# Patient Record
Sex: FEMALE | Employment: FULL TIME
[De-identification: names, ages, dates, MRNs, and addresses within clinical notes are randomized per-mention and may not be internally consistent; named-entity substitution may affect disease eponyms.]

---

## 2020-03-11 ENCOUNTER — HEALTH MAINTENANCE LETTER (OUTPATIENT)
Age: 35
End: 2020-03-11

## 2020-05-19 ENCOUNTER — NURSE TRIAGE (OUTPATIENT)
Dept: NURSING | Facility: CLINIC | Age: 35
End: 2020-05-19

## 2020-05-19 NOTE — TELEPHONE ENCOUNTER
States that she already spoke with [facility] and needs PCR testing.    Transferred to scheduling    Nuvia Lwo RN      Reason for Disposition    Requesting regular office appointment    Protocols used: INFORMATION ONLY CALL-A-AH

## 2021-01-03 ENCOUNTER — HEALTH MAINTENANCE LETTER (OUTPATIENT)
Age: 36
End: 2021-01-03

## 2021-04-25 ENCOUNTER — HEALTH MAINTENANCE LETTER (OUTPATIENT)
Age: 36
End: 2021-04-25

## 2021-10-10 ENCOUNTER — HEALTH MAINTENANCE LETTER (OUTPATIENT)
Age: 36
End: 2021-10-10

## 2021-12-29 NOTE — TELEPHONE ENCOUNTER
Diagnosis, Referred by & from: Rectal Bleeding   Appt date: 3/11/2022   NOTES STATUS DETAILS   OFFICE NOTE from referring provider  N/A    OFFICE NOTE from other specialist   N/A    DISCHARGE SUMMARY from hospital  N/A    DISCHARGE REPORT from the ER N/A    OPERATIVE REPORT  N/A    MEDICATION LIST N/A    LABS     ANAL PAP N/A    BIOPSIES/PATHOLOGY RELATED TO DIAGNOSIS N/A    DIAGNOSTIC PROCEDURES     PFC TESTING (from the Pelvic floor center includes Manometry, PDNL, EMG, etc.)    Note: May include imaging in the testing N/A    COLONOSCOPY N/A    UPPER ENDOSCOPY (EGD) N/A    FLEX SIGMOIDOSCOPY  N/A    ERCP N/A    IMAGING (DISC & REPORT)      CT  N/A    MRI N/A    XRAY N/A    ULTRASOUND (ENDOANAL/ENDORECTAL) N/A

## 2022-01-04 ENCOUNTER — OFFICE VISIT (OUTPATIENT)
Dept: SURGERY | Facility: CLINIC | Age: 37
End: 2022-01-04
Payer: COMMERCIAL

## 2022-01-04 DIAGNOSIS — R19.7 DIARRHEA, UNSPECIFIED TYPE: Primary | ICD-10-CM

## 2022-01-04 DIAGNOSIS — K62.5 RECTAL BLEEDING: ICD-10-CM

## 2022-01-04 DIAGNOSIS — L29.0 PRURITUS ANI: ICD-10-CM

## 2022-01-04 PROCEDURE — 99202 OFFICE O/P NEW SF 15 MIN: CPT | Performed by: NURSE PRACTITIONER

## 2022-01-04 NOTE — PROGRESS NOTES
Colon and Rectal Surgery Consult Clinic Note    Date: 2022     Referring provider:  Referred Self, MD  No address on file     RE: Prachi Mosley  : 1985  PARMINDER: 2022    Prachi Mosley is a very pleasant 36 year old female who presents today for rectal bleeding.    HPI:  Prachi has had issues with perianal itching for over 20 years.  This would occur intermittently and she uses a hydrocortisone cream.  The cream helps but as soon as she stops it the itching returns.  This is gotten worse in the past year.  She has had loose stools for over 10 years and urgency associated with these.  She has now noticed intermittent bright red blood with bowel movements.  This has dripped into the toilet on a few occasions.  She last noticed bleeding a few weeks ago.  She has never had a colonoscopy.  No family history of any inflammatory bowel disease or colon cancer.  Maternal great aunt had breast cancer.    Physical Examination:  There were no vitals taken for this visit.  General: Alert, oriented, in no acute distress  HEENT: mucous membranes moist    Perianal external examination: Exam was chaperoned by Laurence Moreno RN  Perianal skin: Some lichenification and fissuring of her perianal skin.  Lesions: No evidence of an external lesion, nodularity, or induration in the perianal region.  Eversion of buttocks: There was not evidence of an anal fissure. Details: N/A.  Skin tags or external hemorrhoids: None.  Digital rectal examination: Was performed.   Sphincter tone: Good.  Palpable lesions: No.  Other: None.  Bimanual examination: was not performed    Anoscopy: Was performed.   Hemorrhoids: No significant internal hemorrhoids.  Lesions: No    Assessment/Plan: 36 year old female with diarrhea, rectal bleeding, and pruritus ani.  I think her itching is likely due to the loose stools and seepage on her skin.  Recommended bulking her stools up with a fiber supplement such as Metamucil, Citrucel, or Benefiber. Try  folding a gauze in the buttock crease and determine whether there is any fecal staining that could be contributing and to keep area dry, Calmoseptine cream or zinc cream, Avoid scratching to avoid further trauma, increase fiber and water in diet, diet modification-try avoiding spicy foods, caffeine, tomatoes, carbonated beverages, milk products, cheese, chocolate, nuts, etc., using wet wipes rather than toilet paper, try wearing only cotton underwear, and avoid perfume-containing soaps (can try Dove soap).  Would like her to get a colonoscopy given the rectal bleeding and diarrhea.  If diarrhea does not resolve with the above interventions, we will have her meet with gastroenterology. Patient's questions were answered to her stated satisfaction and she is in agreement with this plan.    Medical history:  No past medical history on file.    Surgical history:  No past surgical history on file.    Problem list:  There are no problems to display for this patient.      Medications:  No current outpatient medications on file.       Allergies:  Not on File    Family history:  No family history on file.    Social history:  Social History     Tobacco Use     Smoking status: Not on file     Smokeless tobacco: Not on file   Substance Use Topics     Alcohol use: Not on file    Marital status: .    There are no exam notes on file for this visit.     20 minutes spent on the date of the encounter doing chart review, history and exam, documentation and further activities as noted above.     WOODROW Hernandez, NP-C  Colon and Rectal Surgery   [facility]    This note was created using speech recognition software and may contain unintended word substitutions.

## 2022-01-04 NOTE — PATIENT INSTRUCTIONS
GUIDE FOR PATIENTS WITH PRURITUS ANI    Pruritus ani, literally, means  itchy anus . This condition can have numerous causes, which can be as simple as hemorrhoids or as complex as uncommon infections and skin diseases. The purpose of the history, physical exam, and lab studies we have done and ordered will be to narrow the diagnostic possibilities to assure that you are getting the right treatment. As a first step, there are a number of things that need to be started and continued for the next 3-4 weeks.     1. Diet. Multiple foods can cause or worsen inflammation and itchiness around the anus. These include: coffee, tea, soda, alcohol, citrus, tomatoes, berries, and spicy or heavily spiced foods. These should be reduced or eliminated, if possible, from your diet.     2. Stop ALL creams, ointments, and topical preparations that you apply to your anus. It is very important that you inform us of all medications your are currently taking, including antibiotics, pain killers, and over-the-counter or natural medications. Occasionally (specially when the skin is very irritated), we will recommend a barrier cream, such as Calmoseptine or Desitin. Apply this 3-4 times per day.    3. We recommend daily bathing, preferably in a shower. Do not use medicated or perfumed soaps. Dove  is ideal as it is not soap and does not irritate your skin and do not use between buttocks. Washing-off the anus completely at the end of your shower is helpful to avoid any soap or shampoo residue on your skin. Also, using a hair dryer (on the cool air setting) is recommended to completely dry the perianal skin. If you have persistent leakage or a wet anus, we recommend placing a cotton ball or dry gauze and changing it as needed during the day. Loose, cotton undergarments are also recommended. Avoid recurrent and over-vigorous wiping after bowel movements. Unscented and non-medicated  wet-wipes  or  baby wipes  are preferred.    4. Persistent  itchiness will be treated with an oral medication, such as Atarax. These medications may cause grogginess and upset stomach. Make sure you take it with a meal or with a cracker or piece of toast.    5. Start on a fiber supplement such as Citrucel 3 times a day with 8-10 glasses of water a day. This will allow for a soft and easy bowel movement and will help  soak up  the extra moisture in your colon and rectum.    6. Colonoscopy

## 2022-01-04 NOTE — LETTER
2022       RE: Prachi Mosley  [address]   [place] [address]     Dear Colleague,    Thank you for referring your patient, Prachi Mosley, to the [facility] [place] [facility]. Please see a copy of my visit note below.    Colon and Rectal Surgery Consult Clinic Note    Date: 2022     Referring provider:  Referred Self, MD  No address on file     RE: Prachi Mosley  : 1985  PARMINDER: 2022    Prachi Mosley is a very pleasant 36 year old female who presents today for rectal bleeding.    HPI:  Prachi has had issues with perianal itching for over 20 years.  This would occur intermittently and she uses a hydrocortisone cream.  The cream helps but as soon as she stops it the itching returns.  This is gotten worse in the past year.  She has had loose stools for over 10 years and urgency associated with these.  She has now noticed intermittent bright red blood with bowel movements.  This has dripped into the toilet on a few occasions.  She last noticed bleeding a few weeks ago.  She has never had a colonoscopy.  No family history of any inflammatory bowel disease or colon cancer.  Maternal great aunt had breast cancer.    Physical Examination:  There were no vitals taken for this visit.  General: Alert, oriented, in no acute distress  HEENT: mucous membranes moist    Perianal external examination: Exam was chaperoned by Laurence Moreno RN  Perianal skin: Some lichenification and fissuring of her perianal skin.  Lesions: No evidence of an external lesion, nodularity, or induration in the perianal region.  Eversion of buttocks: There was not evidence of an anal fissure. Details: N/A.  Skin tags or external hemorrhoids: None.  Digital rectal examination: Was performed.   Sphincter tone: Good.  Palpable lesions: No.  Other: None.  Bimanual examination: was not performed    Anoscopy: Was performed.   Hemorrhoids:  No significant internal hemorrhoids.  Lesions: No    Assessment/Plan: 36 year old female with diarrhea, rectal bleeding, and pruritus ani.  I think her itching is likely due to the loose stools and seepage on her skin.  Recommended bulking her stools up with a fiber supplement such as Metamucil, Citrucel, or Benefiber. Try folding a gauze in the buttock crease and determine whether there is any fecal staining that could be contributing and to keep area dry, Calmoseptine cream or zinc cream, Avoid scratching to avoid further trauma, increase fiber and water in diet, diet modification-try avoiding spicy foods, caffeine, tomatoes, carbonated beverages, milk products, cheese, chocolate, nuts, etc., using wet wipes rather than toilet paper, try wearing only cotton underwear, and avoid perfume-containing soaps (can try Dove soap).  Would like her to get a colonoscopy given the rectal bleeding and diarrhea.  If diarrhea does not resolve with the above interventions, we will have her meet with gastroenterology. Patient's questions were answered to her stated satisfaction and she is in agreement with this plan.    Medical history:  No past medical history on file.    Surgical history:  No past surgical history on file.    Problem list:  There are no problems to display for this patient.      Medications:  No current outpatient medications on file.       Allergies:  Not on File    Family history:  No family history on file.    Social history:  Social History     Tobacco Use     Smoking status: Not on file     Smokeless tobacco: Not on file   Substance Use Topics     Alcohol use: Not on file    Marital status: .    There are no exam notes on file for this visit.     20 minutes spent on the date of the encounter doing chart review, history and exam, documentation and further activities as noted above.     WOODROW Hernandez, NP-C  Colon and Rectal Surgery   [facility]    This note  was created using speech recognition software and may contain unintended word substitutions.        Again, thank you for allowing me to participate in the care of your patient.      Sincerely,    WOODROW Gillespie CNP

## 2022-01-04 NOTE — LETTER
Date:January 18, 2022      Patient was self referred, no letter generated. Do not send.        [facility] Information

## 2022-01-05 ENCOUNTER — TELEPHONE (OUTPATIENT)
Dept: GASTROENTEROLOGY | Facility: CLINIC | Age: 37
End: 2022-01-05
Payer: COMMERCIAL

## 2022-01-05 NOTE — TELEPHONE ENCOUNTER
Screening Questions  1. Are you active on mychart? YES    2. What insurance is in the chart? BCBS     2.  Ordering/Referring Provider: America Rowe APRN CNP in [facility] COLON & RECTAL SURGERY    3. BMI 25.0, If greater than 40 review exclusion criteria also will need EXTENDED PREP    4.  Respiratory Screening (If yes to any of the following Hospital setting only):     Do you use daily home oxygen? N  Do you have mod to severe Obstructive Sleep Apnea? N   Do you have Pulmonary Hypertension? N   Do you have UNCONTROLLED asthma? N    5. Have you had a heart or lung transplant (If yes, please review exclusion criteria) ? N    6. Are you currently on dialysis or have chronic kidney disease? N    7. Have you had a stroke or Transient ischemic attack (TIA) within 6 months? N    8. In the past 6 months, have you had any heart related issues including cardiomyopathy or heart attack? N                 If yes, did it require cardiac stenting or other implantable device?N      9. Do you have any implantable devices in your body (pacemaker, defib, LVAD)? N    10. Do you take nitroglycerin? If yes, how often? N    11. Are you currently taking any blood thinners?N    12. Are you a diabetic? N    13. (Females) Are you currently pregnant? N  If yes, how many weeks?      15. Are you taking any prescription pain medications on a routine schedule? N If yes, MAC sedation and patient will need EXTENDED PREP.    16. Do you have any chemical dependencies such as alcohol, street drugs, or methadone? NIf yes, MAC sedation.    17. Do you have any history of post-traumatic stress syndrome, severe anxiety or history of psychosis? N    18. Do you transfer independently? Y    19.  Do you have any issues with constipation? N   If yes, pt will need EXTENDED PREP     20. Preferred Pharmacy for Pre Prescription [facility]     Scheduling Details    Which Colonoscopy Prep was Sent?: MIRALAX   Type of Procedure Scheduled: COLONOSCOPY    Surgeon: DR. WISDOM  Date of Procedure: 01/24/2022  [place] [facility] [place]er (Please ask for phone number if not scheduled by patient): Prachi Mosley      Sedation Type: CS  Conscious Sedation- Needs  for 6 hours after the procedure  MAC/General-Needs  for 24 hours after procedure    Pre-op Required at [facility], [place], [place] and OR for MAC sedation: N  (if yes advise patient they will need a pre-op prior to procedure)      Is patient on blood thinners? -N (If yes- inform patient to follow up with PCP or provider for follow up instructions)     Informed patient they will need an adult  Y  Cannot take any type of public or medical transportation alone    Pre-Procedure Covid test to be completed at [facility] or Externally: SCHEDULED    Confirmed Nurse will call to complete assessment Y    Additional comments: N

## 2022-01-11 DIAGNOSIS — Z11.59 ENCOUNTER FOR SCREENING FOR OTHER VIRAL DISEASES: Primary | ICD-10-CM

## 2022-01-17 ENCOUNTER — TELEPHONE (OUTPATIENT)
Dept: GASTROENTEROLOGY | Facility: CLINIC | Age: 37
End: 2022-01-17

## 2022-01-17 DIAGNOSIS — R19.7 DIARRHEA: Primary | ICD-10-CM

## 2022-01-17 NOTE — TELEPHONE ENCOUNTER
Patient scheduled for colonoscopy on 1/24/22.     Covid test scheduled: 1/21/22    Arrival time: 1255    Facility location: [facility]    Sedation type: CS    Indication for procedure: diarrhea    Anticoagulations? no    Bowel prep recommendation: Miralax/Magnesium citrate/Dulcolax     Pre visit planning completed.    Nuvia Velasquez RN

## 2022-01-18 RX ORDER — POLYETHYLENE GLYCOL 3350 17 G/17G
POWDER, FOR SOLUTION ORAL
Qty: 238 G | Refills: 0 | Status: SHIPPED | OUTPATIENT
Start: 2022-01-18 | End: 2022-05-26

## 2022-01-18 RX ORDER — SIMETHICONE 125 MG
TABLET,CHEWABLE ORAL
Qty: 4 TABLET | Refills: 0 | Status: SHIPPED | OUTPATIENT
Start: 2022-01-18 | End: 2022-05-26

## 2022-01-18 RX ORDER — BISACODYL 5 MG/1
TABLET, DELAYED RELEASE ORAL
Qty: 4 TABLET | Refills: 0 | Status: SHIPPED | OUTPATIENT
Start: 2022-01-18 | End: 2022-05-26

## 2022-01-18 NOTE — TELEPHONE ENCOUNTER
Pre assessment questions completed for upcoming colonoscopy procedure scheduled on 1/24/22    COVID test scheduled 1/21/22    Reviewed procedural arrival time 1255 and facility location [facility].    Designated  policy reviewed. Instructed to have someone stay 6 hours post procedure.     Reviewed Miralax prep instructions with patient. No fiber/iron supplements or foods that contain nuts/seeds 7 days prior to procedure.     Patient requesting Miralax prep to be sent to pharmacy. Sent as requested to [place], [place] - [address] [place] [address] [phone]    Anticoagulation/blood thinners? no    Electronic implanted devices? no    Patient verbalized understanding and had no questions or concerns at this time.    Nuvia Velasquez RN

## 2022-01-21 ENCOUNTER — LAB (OUTPATIENT)
Dept: LAB | Facility: CLINIC | Age: 37
End: 2022-01-21
Payer: COMMERCIAL

## 2022-01-21 DIAGNOSIS — Z11.59 ENCOUNTER FOR SCREENING FOR OTHER VIRAL DISEASES: ICD-10-CM

## 2022-01-21 PROCEDURE — 99000 SPECIMEN HANDLING OFFICE-LAB: CPT | Performed by: PATHOLOGY

## 2022-01-21 PROCEDURE — U0005 INFEC AGEN DETEC AMPLI PROBE: HCPCS | Mod: 90 | Performed by: PATHOLOGY

## 2022-01-21 PROCEDURE — U0003 INFECTIOUS AGENT DETECTION BY NUCLEIC ACID (DNA OR RNA); SEVERE ACUTE RESPIRATORY SYNDROME CORONAVIRUS 2 (SARS-COV-2) (CORONAVIRUS DISEASE [COVID-19]), AMPLIFIED PROBE TECHNIQUE, MAKING USE OF HIGH THROUGHPUT TECHNOLOGIES AS DESCRIBED BY CMS-2020-01-R: HCPCS | Mod: 90 | Performed by: PATHOLOGY

## 2022-01-22 LAB — SARS-COV-2 RNA RESP QL NAA+PROBE: NEGATIVE

## 2022-01-24 ENCOUNTER — ANESTHESIA (OUTPATIENT)
Dept: SURGERY | Facility: AMBULATORY SURGERY CENTER | Age: 37
End: 2022-01-24
Payer: COMMERCIAL

## 2022-01-24 ENCOUNTER — HOSPITAL ENCOUNTER (OUTPATIENT)
Facility: AMBULATORY SURGERY CENTER | Age: 37
End: 2022-01-24
Attending: INTERNAL MEDICINE
Payer: COMMERCIAL

## 2022-01-24 ENCOUNTER — ANESTHESIA EVENT (OUTPATIENT)
Dept: SURGERY | Facility: AMBULATORY SURGERY CENTER | Age: 37
End: 2022-01-24
Payer: COMMERCIAL

## 2022-01-24 VITALS
TEMPERATURE: 97 F | SYSTOLIC BLOOD PRESSURE: 105 MMHG | BODY MASS INDEX: 22.31 KG/M2 | RESPIRATION RATE: 14 BRPM | WEIGHT: 121.25 LBS | HEIGHT: 62 IN | DIASTOLIC BLOOD PRESSURE: 77 MMHG | OXYGEN SATURATION: 100 % | HEART RATE: 69 BPM

## 2022-01-24 VITALS — HEART RATE: 74 BPM

## 2022-01-24 LAB
COLONOSCOPY: NORMAL
HCG UR QL: NEGATIVE
INTERNAL QC OK POCT: NORMAL
POCT KIT EXPIRATION DATE: NORMAL
POCT KIT LOT NUMBER: NORMAL

## 2022-01-24 PROCEDURE — 88305 TISSUE EXAM BY PATHOLOGIST: CPT | Mod: TC | Performed by: INTERNAL MEDICINE

## 2022-01-24 PROCEDURE — 88305 TISSUE EXAM BY PATHOLOGIST: CPT | Mod: 26 | Performed by: PATHOLOGY

## 2022-01-24 PROCEDURE — 45380 COLONOSCOPY AND BIOPSY: CPT | Mod: 59

## 2022-01-24 PROCEDURE — 88312 SPECIAL STAINS GROUP 1: CPT | Mod: 26 | Performed by: PATHOLOGY

## 2022-01-24 PROCEDURE — 45385 COLONOSCOPY W/LESION REMOVAL: CPT

## 2022-01-24 RX ORDER — ONDANSETRON 2 MG/ML
4 INJECTION INTRAMUSCULAR; INTRAVENOUS EVERY 6 HOURS PRN
Status: DISCONTINUED | OUTPATIENT
Start: 2022-01-24 | End: 2022-01-25 | Stop reason: HOSPADM

## 2022-01-24 RX ORDER — LIDOCAINE 40 MG/G
CREAM TOPICAL
Status: DISCONTINUED | OUTPATIENT
Start: 2022-01-24 | End: 2022-01-24 | Stop reason: HOSPADM

## 2022-01-24 RX ORDER — PROCHLORPERAZINE MALEATE 10 MG
10 TABLET ORAL EVERY 6 HOURS PRN
Status: DISCONTINUED | OUTPATIENT
Start: 2022-01-24 | End: 2022-01-25 | Stop reason: HOSPADM

## 2022-01-24 RX ORDER — FLUMAZENIL 0.1 MG/ML
0.2 INJECTION, SOLUTION INTRAVENOUS
Status: DISCONTINUED | OUTPATIENT
Start: 2022-01-24 | End: 2022-01-25 | Stop reason: HOSPADM

## 2022-01-24 RX ORDER — ONDANSETRON 2 MG/ML
4 INJECTION INTRAMUSCULAR; INTRAVENOUS
Status: DISCONTINUED | OUTPATIENT
Start: 2022-01-24 | End: 2022-01-24 | Stop reason: HOSPADM

## 2022-01-24 RX ORDER — NALOXONE HYDROCHLORIDE 0.4 MG/ML
0.4 INJECTION, SOLUTION INTRAMUSCULAR; INTRAVENOUS; SUBCUTANEOUS
Status: DISCONTINUED | OUTPATIENT
Start: 2022-01-24 | End: 2022-01-25 | Stop reason: HOSPADM

## 2022-01-24 RX ORDER — ONDANSETRON 4 MG/1
4 TABLET, ORALLY DISINTEGRATING ORAL EVERY 6 HOURS PRN
Status: DISCONTINUED | OUTPATIENT
Start: 2022-01-24 | End: 2022-01-25 | Stop reason: HOSPADM

## 2022-01-24 RX ORDER — SODIUM CHLORIDE, SODIUM LACTATE, POTASSIUM CHLORIDE, CALCIUM CHLORIDE 600; 310; 30; 20 MG/100ML; MG/100ML; MG/100ML; MG/100ML
INJECTION, SOLUTION INTRAVENOUS CONTINUOUS
Status: DISCONTINUED | OUTPATIENT
Start: 2022-01-24 | End: 2022-01-24 | Stop reason: HOSPADM

## 2022-01-24 RX ORDER — NALOXONE HYDROCHLORIDE 0.4 MG/ML
0.2 INJECTION, SOLUTION INTRAMUSCULAR; INTRAVENOUS; SUBCUTANEOUS
Status: DISCONTINUED | OUTPATIENT
Start: 2022-01-24 | End: 2022-01-25 | Stop reason: HOSPADM

## 2022-01-24 RX ORDER — PROPOFOL 10 MG/ML
INJECTION, EMULSION INTRAVENOUS PRN
Status: DISCONTINUED | OUTPATIENT
Start: 2022-01-24 | End: 2022-01-24

## 2022-01-24 RX ORDER — PROPOFOL 10 MG/ML
INJECTION, EMULSION INTRAVENOUS CONTINUOUS PRN
Status: DISCONTINUED | OUTPATIENT
Start: 2022-01-24 | End: 2022-01-24

## 2022-01-24 RX ORDER — SIMETHICONE
LIQUID (ML) MISCELLANEOUS PRN
Status: DISCONTINUED | OUTPATIENT
Start: 2022-01-24 | End: 2022-01-24 | Stop reason: HOSPADM

## 2022-01-24 RX ORDER — LIDOCAINE HYDROCHLORIDE 20 MG/ML
INJECTION, SOLUTION INFILTRATION; PERINEURAL PRN
Status: DISCONTINUED | OUTPATIENT
Start: 2022-01-24 | End: 2022-01-24

## 2022-01-24 RX ADMIN — LIDOCAINE HYDROCHLORIDE 60 MG: 20 INJECTION, SOLUTION INFILTRATION; PERINEURAL at 14:14

## 2022-01-24 RX ADMIN — PROPOFOL 50 MG: 10 INJECTION, EMULSION INTRAVENOUS at 14:17

## 2022-01-24 RX ADMIN — PROPOFOL 250 MCG/KG/MIN: 10 INJECTION, EMULSION INTRAVENOUS at 14:14

## 2022-01-24 RX ADMIN — PROPOFOL 30 MG: 10 INJECTION, EMULSION INTRAVENOUS at 14:19

## 2022-01-24 RX ADMIN — SODIUM CHLORIDE, SODIUM LACTATE, POTASSIUM CHLORIDE, CALCIUM CHLORIDE: 600; 310; 30; 20 INJECTION, SOLUTION INTRAVENOUS at 14:08

## 2022-01-24 ASSESSMENT — MIFFLIN-ST. JEOR: SCORE: 1191.5

## 2022-01-24 NOTE — ANESTHESIA PREPROCEDURE EVALUATION
Anesthesia Pre-Procedure Evaluation    Patient: Prachi Mosley   MRN: [id number] : 1985        Preoperative Diagnosis: Diarrhea, unspecified type [R19.7]    Procedure : Procedure(s):  COLONOSCOPY          No past medical history on file.   No past surgical history on file.   Not on File   Social History     Tobacco Use     Smoking status: Not on file     Smokeless tobacco: Not on file   Substance Use Topics     Alcohol use: Not on file      Wt Readings from Last 1 Encounters:   No data found for Wt        Anesthesia Evaluation            ROS/MED HX  ENT/Pulmonary:  - neg pulmonary ROS     Neurologic:  - neg neurologic ROS     Cardiovascular:  - neg cardiovascular ROS     METS/Exercise Tolerance:     Hematologic:  - neg hematologic  ROS     Musculoskeletal:  - neg musculoskeletal ROS     GI/Hepatic: Comment: Diarrhea, rectal bleeding      Renal/Genitourinary:  - neg Renal ROS     Endo:  - neg endo ROS     Psychiatric/Substance Use:  - neg psychiatric ROS     Infectious Disease:  - neg infectious disease ROS     Malignancy:       Other:            Physical Exam    Airway        Mallampati: I   TM distance: > 3 FB   Neck ROM: full   Mouth opening: > 3 cm    Respiratory Devices and Support         Dental  no notable dental history         Cardiovascular   cardiovascular exam normal          Pulmonary   pulmonary exam normal                OUTSIDE LABS:  CBC: No results found for: WBC, HGB, HCT, PLT  BMP: No results found for: NA, POTASSIUM, CHLORIDE, CO2, BUN, CR, GLC  COAGS: No results found for: PTT, INR, FIBR  POC: No results found for: BGM, HCG, HCGS  HEPATIC: No results found for: ALBUMIN, PROTTOTAL, ALT, AST, GGT, ALKPHOS, BILITOTAL, BILIDIRECT, MILADYS  OTHER: No results found for: PH, LACT, A1C, SMITA, PHOS, MAG, LIPASE, AMYLASE, TSH, T4, T3, CRP, SED    Anesthesia Plan    ASA Status:  1   NPO Status:  NPO Appropriate    Anesthesia Type: MAC.     - Reason for MAC: straight local not clinically adequate,  immobility needed   Induction: Intravenous.   Maintenance: TIVA.        Consents    Anesthesia Plan(s) and associated risks, benefits, and realistic alternatives discussed. Questions answered and patient/representative(s) expressed understanding.     - Discussed: Risks, Benefits and Alternatives for BOTH SEDATION and the PROCEDURE were discussed     - Discussed with:  Patient      - Extended Intubation/Ventilatory Support Discussed: No.      - Patient is DNR/DNI Status: No    Use of blood products discussed: No .     Postoperative Care    Pain management: Multi-modal analgesia.   PONV prophylaxis: Background Propofol Infusion     Comments:                JOSIE ASIF MD

## 2022-01-24 NOTE — ANESTHESIA POSTPROCEDURE EVALUATION
Patient: Prachi Mosley    Procedure: Procedure(s):  COLONOSCOPY, WITH POLYPECTOMY AND BIOPSY       Diagnosis:Diarrhea, unspecified type [R19.7]  Diagnosis Additional Information: No value filed.    Anesthesia Type:  MAC    Note:  Disposition: Outpatient   Postop Pain Control: Uneventful            Sign Out: Well controlled pain   PONV: No   Neuro/Psych: Uneventful            Sign Out: Acceptable/Baseline neuro status   Airway/Respiratory: Uneventful            Sign Out: Acceptable/Baseline resp. status   CV/Hemodynamics: Uneventful            Sign Out: Acceptable CV status; No obvious hypovolemia; No obvious fluid overload   Other NRE: NONE   DID A NON-ROUTINE EVENT OCCUR? No           Last vitals:  Vitals Value Taken Time   /77 01/24/22 1515   Temp 36.1  C (97  F) 01/24/22 1515   Pulse 69 01/24/22 1515   Resp 14 01/24/22 1515   SpO2 100 % 01/24/22 1500       Electronically Signed By: JOSIE ASIF MD  January 24, 2022  3:28 PM

## 2022-01-24 NOTE — ANESTHESIA CARE TRANSFER NOTE
Patient: Prachi Mosley    Procedure: Procedure(s):  COLONOSCOPY, WITH POLYPECTOMY AND BIOPSY       Diagnosis: Diarrhea, unspecified type [R19.7]  Diagnosis Additional Information: No value filed.    Anesthesia Type:   MAC     Note:    Oropharynx: oropharynx clear of all foreign objects  Level of Consciousness: awake  Oxygen Supplementation: room air    Independent Airway: airway patency satisfactory and stable  Dentition: dentition unchanged  Vital Signs Stable: post-procedure vital signs reviewed and stable  Report to RN Given: handoff report given  Patient transferred to: Phase II    Handoff Report: Identifed the Patient, Identified the Reponsible Provider, Reviewed the pertinent medical history, Discussed the surgical course, Reviewed Intra-OP anesthesia mangement and issues during anesthesia, Set expectations for post-procedure period and Allowed opportunity for questions and acknowledgement of understanding      Vitals:  Vitals Value Taken Time   /77 01/24/22 1515   Temp 36.1  C (97  F) 01/24/22 1515   Pulse 69 01/24/22 1515   Resp 14 01/24/22 1515   SpO2 100 % 01/24/22 1500       Electronically Signed By: WOODROW Broussard CRNA  January 24, 2022  3:31 PM

## 2022-01-27 LAB
PATH REPORT.COMMENTS IMP SPEC: NORMAL
PATH REPORT.FINAL DX SPEC: NORMAL
PATH REPORT.GROSS SPEC: NORMAL
PATH REPORT.MICROSCOPIC SPEC OTHER STN: NORMAL
PATH REPORT.RELEVANT HX SPEC: NORMAL
PHOTO IMAGE: NORMAL

## 2022-01-28 ENCOUNTER — MYC MEDICAL ADVICE (OUTPATIENT)
Dept: SURGERY | Facility: CLINIC | Age: 37
End: 2022-01-28
Payer: COMMERCIAL

## 2022-03-11 ENCOUNTER — PRE VISIT (OUTPATIENT)
Dept: SURGERY | Facility: CLINIC | Age: 37
End: 2022-03-11
Payer: COMMERCIAL

## 2022-03-15 NOTE — PROGRESS NOTES
Colon and Rectal Surgery Follow-Up Clinic Note    RE: Prachi Mosley  : 1985  PARMINDER: 3/16/2022    Prachi Mosley is a very pleasant 36 year old female here for anal cytology today.    Interval history: I saw Prachi in January of this year with diarrhea, rectal bleeding and pruritus ani. Colonoscopy 22 with erythematous mucosa in the distal rectum that was biopsied and whitish mucosa at the anus that was also biopsied.     Final Diagnosis   A(1). ANUS, DENTATE LINE, BIOPSY:  - Keratinized squamous epithelium with koilocyte-like changes and no significant inflammation (see comment)  - No evidence of high-grade dysplasia  - Negative for fungal organisms by PAS-fungus stain     B(2). DISTAL RECTUM, BIOPSY:  - Colonic mucosa with no significant histopathologic abnormality  - No evidence of chronic active or microscopic colitis     C(3). TRANSVERSE COLON, BIOPSY:  - Colonic mucosa with no significant histopathologic abnormality  - No evidence of chronic active or microscopic colitis     D(4). DESCENDING COLON POLYP, POLYPECTOMY:  - Colonic mucosa with superficial hyperplastic changes  - No evidence of neoplastic polyp      Comment     The anal biopsy shows keratinized squamous epithelium with koilocytic changes typically seen in HPV infection, although such changes can sometimes also be seen in non-HPV setting such as chronic irritation.  There is no evidence of high-grade dysplasia or any significant inflammation.  Clinical correlation is recommended.      Her itching has gotten much worse. She gets some relief with Calmoseptine but this is temporary. She is worried about psoriasis since she did have that on her scalp when she was younger. She is using the fiber supplement once a day and has not had any diarrhea or noticeable leakage so does not feel this is a moisture issue.     Physical Examination: Exam was chaperoned by Charissa Deluna MA   /73 (BP Location: Left arm, Patient Position: Sitting, Cuff  "Size: Adult Regular)   Pulse 70   Ht 5' 2\"   Wt 128 lb 12.8 oz   SpO2 96%   BMI 23.56 kg/m    Alert, oriented, no acute distress, sitting comfortably.  Perianal exam with some lichenification of the perianal skin extending only a few centimeters outside of the anal verge.  No excoriation.    Procedures:  Prior to the start of the procedure and with procedural staff participation, I verbally confirmed the patient s identity using two indicators, relevant allergies, that the procedure was appropriate and matched the consent or emergent situation, and that the correct equipment/implants were available. Immediately prior to starting the procedure I conducted the Time Out with the procedural staff and re-confirmed the patient s name, procedure, and site/side. (The Joint Commission universal protocol was followed.)  Yes    Sedation (Moderate or Deep): None     After injection with 1% lidocaine with epinephrine, a 3 mm punch biopsy was obtained the left posterior position just outside the anal verge.  A 4-0 Vicryl suture was placed with hemostasis obtained.  Bacitracin dressing applied.    Assessment/Plan: 36 year old female with pruritus ani.  Her itching has gotten worse despite use of calmoseptine and a fiber supplement.  Recommended increasing the fiber supplement to 2-3 times a day and continue with zinc barrier use.  Obtained perianal skin biopsy today and anal cytology given the concern for HPV on the biopsy from colonoscopy.  We will also refer her to dermatology for a second opinion.  If no abnormal findings on the above work-up, could consider applying Burwick solution. Patient's questions were answered to her stated satisfaction and she is in agreement with this plan.      Medical history:  No past medical history on file.    Surgical history:  Past Surgical History:   Procedure Laterality Date     COLONOSCOPY N/A 1/24/2022    Procedure: COLONOSCOPY, WITH POLYPECTOMY AND BIOPSY;  Surgeon: Yakelin Gomez, " "MD;  Location: [facility] OR       Problem list:  There are no problems to display for this patient.      Medications:  Current Outpatient Medications   Medication Sig Dispense Refill     bisacodyl (DULCOLAX) 5 MG EC tablet Take as directed. One day before the exam at 4 PM take 2 Dulcolax (Bisacodyl) tablets.  Day of exam at 6 AM take 2 Dulcolax (Bisacodyl) tablets. 4 tablet 0     magnesium citrate solution Drink 10 ounces of clear Magnesium Citrate 6 hours prior to your scheduled arrival to the endoscopy unit. 296 mL 0     polyethylene glycol (MIRALAX) 17 GM/Dose powder Take as directed. Day before exam At 4 pm, mix the entire bottle of Miralax with 64 ounces of Gatorade in a pitcher and stir to dissolve the powder. Chill if desired, but do not add ice. At 5 pm, start drinking an 8-ounce glass of the Miralax and Gatorade mixture every 15 minutes until the pitcher is half empty (about 4 glasses).  Store the rest in the refrigerator. Day of exam at 6 AM start drinking an 8-ounce glass of Miralax and Gatorade mixture every 15 minutes until the pitcher is empty. 238 g 0     simethicone (MYLICON) 125 MG chewable tablet Take four Simethicone (Mylicon) gas relief tablets after the prep is finished. 4 tablet 0       Allergies:  No Known Allergies    Family history:  No family history on file.    Social history:  Social History     Tobacco Use     Smoking status: Never Smoker     Smokeless tobacco: Never Used   Substance Use Topics     Alcohol use: Yes     Comment: Very rarely     Marital status: .    Nursing Notes:   Charissa Deluna  3/16/2022  1:55 PM  Signed  Chief Complaint   Patient presents with     Consult     anal pap       Vitals:    03/16/22 1353   BP: 114/73   BP Location: Left arm   Patient Position: Sitting   Cuff Size: Adult Regular   Pulse: 70   SpO2: 96%   Weight: 128 lb 12.8 oz   Height: 5' 2\"       Body mass index is 23.56 kg/m .    Charissa Deluna CMA        15 minutes spent on the date of encounter " (excluding time performing procedures with or without biopsy) performing chart review, history and exam, documentation and further activities as noted above with an additional 5 minutes for the procedure.    America Ureña NP-C  Colon and Rectal Surgery  [facility]

## 2022-03-16 ENCOUNTER — OFFICE VISIT (OUTPATIENT)
Dept: SURGERY | Facility: CLINIC | Age: 37
End: 2022-03-16
Payer: COMMERCIAL

## 2022-03-16 VITALS
HEART RATE: 70 BPM | HEIGHT: 62 IN | SYSTOLIC BLOOD PRESSURE: 114 MMHG | OXYGEN SATURATION: 96 % | WEIGHT: 128.8 LBS | BODY MASS INDEX: 23.7 KG/M2 | DIASTOLIC BLOOD PRESSURE: 73 MMHG

## 2022-03-16 DIAGNOSIS — L29.0 PRURITUS ANI: Primary | ICD-10-CM

## 2022-03-16 LAB
LAB DIRECTOR COMMENTS: NORMAL
LAB DIRECTOR DISCLAIMER: NORMAL
LAB DIRECTOR INTERPRETATION: NORMAL
LAB DIRECTOR METHODOLOGY: NORMAL
LAB DIRECTOR RESULTS: NORMAL
SPECIMEN DESCRIPTION: NORMAL

## 2022-03-16 PROCEDURE — 88305 TISSUE EXAM BY PATHOLOGIST: CPT | Mod: TC | Performed by: NURSE PRACTITIONER

## 2022-03-16 PROCEDURE — 88112 CYTOPATH CELL ENHANCE TECH: CPT | Mod: 26 | Performed by: PATHOLOGY

## 2022-03-16 PROCEDURE — 88305 TISSUE EXAM BY PATHOLOGIST: CPT | Mod: 26 | Performed by: PATHOLOGY

## 2022-03-16 PROCEDURE — 88112 CYTOPATH CELL ENHANCE TECH: CPT | Mod: TC | Performed by: NURSE PRACTITIONER

## 2022-03-16 PROCEDURE — 99213 OFFICE O/P EST LOW 20 MIN: CPT | Mod: 25 | Performed by: NURSE PRACTITIONER

## 2022-03-16 PROCEDURE — G0452 MOLECULAR PATHOLOGY INTERPR: HCPCS | Mod: 26 | Performed by: STUDENT IN AN ORGANIZED HEALTH CARE EDUCATION/TRAINING PROGRAM

## 2022-03-16 PROCEDURE — 11104 PUNCH BX SKIN SINGLE LESION: CPT | Performed by: NURSE PRACTITIONER

## 2022-03-16 PROCEDURE — 87624 HPV HI-RISK TYP POOLED RSLT: CPT | Performed by: NURSE PRACTITIONER

## 2022-03-16 ASSESSMENT — PAIN SCALES - GENERAL: PAINLEVEL: NO PAIN (0)

## 2022-03-16 NOTE — NURSING NOTE
"Chief Complaint   Patient presents with     Consult     anal pap       Vitals:    03/16/22 1353   BP: 114/73   BP Location: Left arm   Patient Position: Sitting   Cuff Size: Adult Regular   Pulse: 70   SpO2: 96%   Weight: 128 lb 12.8 oz   Height: 5' 2\"       Body mass index is 23.56 kg/m .    Charissa Deluna CMA    "

## 2022-03-16 NOTE — LETTER
Date:March 17, 2022      Patient was self referred, no letter generated. Do not send.        [facility] Information

## 2022-03-16 NOTE — LETTER
3/16/2022       RE: Prachi Mosley  [address]   [place] [address]     Dear Colleague,    Thank you for referring your patient, Prachi Mosley, to the [facility] COLON AND RECTAL SURGERY CLINIC [place] [facility]. Please see a copy of my visit note below.    Colon and Rectal Surgery Follow-Up Clinic Note    RE: Prachi Mosley  : 1985  PARMINDER: 3/16/2022    Prachi Mosley is a very pleasant 36 year old female here for anal cytology today.    Interval history: I saw Prachi in January of this year with diarrhea, rectal bleeding and pruritus ani. Colonoscopy 22 with erythematous mucosa in the distal rectum that was biopsied and whitish mucosa at the anus that was also biopsied.     Final Diagnosis   A(1). ANUS, DENTATE LINE, BIOPSY:  - Keratinized squamous epithelium with koilocyte-like changes and no significant inflammation (see comment)  - No evidence of high-grade dysplasia  - Negative for fungal organisms by PAS-fungus stain     B(2). DISTAL RECTUM, BIOPSY:  - Colonic mucosa with no significant histopathologic abnormality  - No evidence of chronic active or microscopic colitis     C(3). TRANSVERSE COLON, BIOPSY:  - Colonic mucosa with no significant histopathologic abnormality  - No evidence of chronic active or microscopic colitis     D(4). DESCENDING COLON POLYP, POLYPECTOMY:  - Colonic mucosa with superficial hyperplastic changes  - No evidence of neoplastic polyp      Comment     The anal biopsy shows keratinized squamous epithelium with koilocytic changes typically seen in HPV infection, although such changes can sometimes also be seen in non-HPV setting such as chronic irritation.  There is no evidence of high-grade dysplasia or any significant inflammation.  Clinical correlation is recommended.      Her itching has gotten much worse. She gets some relief with Calmoseptine but this is temporary. She is worried about psoriasis since  "she did have that on her scalp when she was younger. She is using the fiber supplement once a day and has not had any diarrhea or noticeable leakage so does not feel this is a moisture issue.     Physical Examination: Exam was chaperoned by Charissa Deluna MA   /73 (BP Location: Left arm, Patient Position: Sitting, Cuff Size: Adult Regular)   Pulse 70   Ht 5' 2\"   Wt 128 lb 12.8 oz   SpO2 96%   BMI 23.56 kg/m    Alert, oriented, no acute distress, sitting comfortably.  Perianal exam with some lichenification of the perianal skin extending only a few centimeters outside of the anal verge.  No excoriation.    Procedures:  Prior to the start of the procedure and with procedural staff participation, I verbally confirmed the patient s identity using two indicators, relevant allergies, that the procedure was appropriate and matched the consent or emergent situation, and that the correct equipment/implants were available. Immediately prior to starting the procedure I conducted the Time Out with the procedural staff and re-confirmed the patient s name, procedure, and site/side. (The Joint Commission universal protocol was followed.)  Yes    Sedation (Moderate or Deep): None     After injection with 1% lidocaine with epinephrine, a 3 mm punch biopsy was obtained the left posterior position just outside the anal verge.  A 4-0 Vicryl suture was placed with hemostasis obtained.  Bacitracin dressing applied.    Assessment/Plan: 36 year old female with pruritus ani.  Her itching has gotten worse despite use of calmoseptine and a fiber supplement.  Recommended increasing the fiber supplement to 2-3 times a day and continue with zinc barrier use.  Obtained perianal skin biopsy today and anal cytology given the concern for HPV on the biopsy from colonoscopy.  We will also refer her to dermatology for a second opinion.  If no abnormal findings on the above work-up, could consider applying Burwick solution. Patient's " questions were answered to her stated satisfaction and she is in agreement with this plan.      Medical history:  No past medical history on file.    Surgical history:  Past Surgical History:   Procedure Laterality Date     COLONOSCOPY N/A 1/24/2022    Procedure: COLONOSCOPY, WITH POLYPECTOMY AND BIOPSY;  Surgeon: Yakelin Gomez MD;  Location: [facility] OR       Problem list:  There are no problems to display for this patient.      Medications:  Current Outpatient Medications   Medication Sig Dispense Refill     bisacodyl (DULCOLAX) 5 MG EC tablet Take as directed. One day before the exam at 4 PM take 2 Dulcolax (Bisacodyl) tablets.  Day of exam at 6 AM take 2 Dulcolax (Bisacodyl) tablets. 4 tablet 0     magnesium citrate solution Drink 10 ounces of clear Magnesium Citrate 6 hours prior to your scheduled arrival to the endoscopy unit. 296 mL 0     polyethylene glycol (MIRALAX) 17 GM/Dose powder Take as directed. Day before exam At 4 pm, mix the entire bottle of Miralax with 64 ounces of Gatorade in a pitcher and stir to dissolve the powder. Chill if desired, but do not add ice. At 5 pm, start drinking an 8-ounce glass of the Miralax and Gatorade mixture every 15 minutes until the pitcher is half empty (about 4 glasses).  Store the rest in the refrigerator. Day of exam at 6 AM start drinking an 8-ounce glass of Miralax and Gatorade mixture every 15 minutes until the pitcher is empty. 238 g 0     simethicone (MYLICON) 125 MG chewable tablet Take four Simethicone (Mylicon) gas relief tablets after the prep is finished. 4 tablet 0       Allergies:  No Known Allergies    Family history:  No family history on file.    Social history:  Social History     Tobacco Use     Smoking status: Never Smoker     Smokeless tobacco: Never Used   Substance Use Topics     Alcohol use: Yes     Comment: Very rarely     Marital status: .    Nursing Notes:   Charissa Deluna  3/16/2022  1:55 PM  Signed  Chief Complaint   Patient presents  "with     Consult     anal pap       Vitals:    03/16/22 1353   BP: 114/73   BP Location: Left arm   Patient Position: Sitting   Cuff Size: Adult Regular   Pulse: 70   SpO2: 96%   Weight: 128 lb 12.8 oz   Height: 5' 2\"       Body mass index is 23.56 kg/m .    Charissa Deluna CMA        15 minutes spent on the date of encounter (excluding time performing procedures with or without biopsy) performing chart review, history and exam, documentation and further activities as noted above with an additional 5 minutes for the procedure.    America Bangura NP-C  Colon and Rectal Surgery  [facility]        Again, thank you for allowing me to participate in the care of your patient.      Sincerely,    Ameriac Bangura, APRN CNP      "

## 2022-03-17 LAB
PATH REPORT.COMMENTS IMP SPEC: NORMAL
PATH REPORT.FINAL DX SPEC: NORMAL
PATH REPORT.FINAL DX SPEC: NORMAL
PATH REPORT.GROSS SPEC: NORMAL
PATH REPORT.GROSS SPEC: NORMAL
PATH REPORT.MICROSCOPIC SPEC OTHER STN: NORMAL
PATH REPORT.RELEVANT HX SPEC: NORMAL
PATH REPORT.RELEVANT HX SPEC: NORMAL
PHOTO IMAGE: NORMAL

## 2022-05-12 ENCOUNTER — LAB REQUISITION (OUTPATIENT)
Dept: LAB | Facility: CLINIC | Age: 37
End: 2022-05-12

## 2022-05-12 PROCEDURE — 86481 TB AG RESPONSE T-CELL SUSP: CPT | Performed by: INTERNAL MEDICINE

## 2022-05-14 LAB
QUANTIFERON MITOGEN: 10 IU/ML
QUANTIFERON NIL TUBE: 0.1 IU/ML
QUANTIFERON TB1 TUBE: 0.11 IU/ML
QUANTIFERON TB2 TUBE: 0.12

## 2022-05-15 LAB
GAMMA INTERFERON BACKGROUND BLD IA-ACNC: 0.1 IU/ML
M TB IFN-G BLD-IMP: NEGATIVE
M TB IFN-G CD4+ BCKGRND COR BLD-ACNC: 9.9 IU/ML
MITOGEN IGNF BCKGRD COR BLD-ACNC: 0.01 IU/ML
MITOGEN IGNF BCKGRD COR BLD-ACNC: 0.02 IU/ML

## 2022-05-21 ENCOUNTER — HEALTH MAINTENANCE LETTER (OUTPATIENT)
Age: 37
End: 2022-05-21

## 2022-05-26 ENCOUNTER — OFFICE VISIT (OUTPATIENT)
Dept: DERMATOLOGY | Facility: CLINIC | Age: 37
End: 2022-05-26
Attending: NURSE PRACTITIONER
Payer: COMMERCIAL

## 2022-05-26 VITALS — DIASTOLIC BLOOD PRESSURE: 68 MMHG | OXYGEN SATURATION: 99 % | HEART RATE: 80 BPM | SYSTOLIC BLOOD PRESSURE: 104 MMHG

## 2022-05-26 DIAGNOSIS — L29.0 PRURITUS ANI: Primary | ICD-10-CM

## 2022-05-26 DIAGNOSIS — L70.0 ACNE VULGARIS: ICD-10-CM

## 2022-05-26 DIAGNOSIS — L72.0 MILIA: ICD-10-CM

## 2022-05-26 PROCEDURE — 99204 OFFICE O/P NEW MOD 45 MIN: CPT | Performed by: NURSE PRACTITIONER

## 2022-05-26 RX ORDER — TRETINOIN 0.25 MG/G
CREAM TOPICAL
Qty: 45 G | Refills: 11 | Status: SHIPPED | OUTPATIENT
Start: 2022-05-26

## 2022-05-26 RX ORDER — DESONIDE 0.5 MG/G
OINTMENT TOPICAL 2 TIMES DAILY
Qty: 60 G | Refills: 0 | Status: SHIPPED | OUTPATIENT
Start: 2022-05-26

## 2022-05-26 ASSESSMENT — PAIN SCALES - GENERAL: PAINLEVEL: NO PAIN (0)

## 2022-05-26 NOTE — PATIENT INSTRUCTIONS
Cotton underwear only, no thongs    No wipes    Apply the desonide cream twice daily for 2-3 weeks, then once daily for 2 weeks, then once every other day for 2 weeks. Then okay to use as needed.     Vaseline, zinc paste, and desonide cream mixture can be helpful when you have irritation and rash. You may want to make a mixture of equal parts of these three creams and keep it for flares.

## 2022-05-26 NOTE — PROGRESS NOTES
[facility] Dermatology Note  Encounter Date: May 26, 2022  Office Visit     Reviewed patients past medical history and pertinent chart review prior to patients visit today.     Dermatology Problem List:  Family history of psoriasis.       ____________________________________________    Assessment & Plan:     # 1. Pruritus ani  Advised to wear primarily cotton underwear and no thanks.  Continue not to use any wipes to the perianal area.  Continue zinc paste after bowel movement.  Start desonide ointment twice daily for 2 to 3 weeks then decrease to once daily for 2 weeks then decrease to every other day for 2 weeks then discontinue.  Advised to avoid face, groin and skin folds. Councled about use and side effects of thinning of the skin, striae, erythema, and worsening of rash.   Advised to combine equal parts zinc oxide, Vaseline, and desonide ointment and use twice daily as needed for itching for flares.  If this is not effective I think we should try adding in some ketoconazole as well.  We discussed the itch scratch cycle.    - Adult Dermatology Referral  - desonide (DESOWEN) 0.05 % external ointment; Apply topically 2 times daily For 3 weeks then once daily for 2 weeks then every other day for 2 weeks.  Dispense: 60 g; Refill: 0    2.  Mild acne vulgaris and milia cyst  - tretinoin (RETIN-A) 0.025 % external cream; Use every night to all areas of acne  Dispense: 45 g; Refill: 11      WOODROW Silva CNP on 5/26/2022 at 2:12 PM   _______________________________________    CC: Derm Problem (Idalmis annal itching)    HPI:  Ms. Prachi Mosley is a(n) 37 year old female who presents today as a new patient for anal itching. She says she has always had occasional itchy irritation of the anus for the past 15 years. She thinks he may have some irritable bowel because she regularly has loose stools. About 9 months ago she started having some blood in her stool and she saw colorectal surgery and  did a colonoscopy and did not see anything cancerous. She was tested for HPV which came back negative. Calmoseptin cream (zinc + menthol) she is using 2-3 times or more per day after each stool. This is not helping.     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     She tried over the counter hydrocortisone and this was not helpful. She then switched to prescription advantan and this stopped the itching. She used BID for 1-2 weeks then tapered down off of it and went back to the hydrocortisone and was able to stop it but it came back about 1 week later.     Negative for hpv. In c    She has family history of psoriasis. She has some flaky skin on her scalp that she wonders if it could be psorasis.   Patient is otherwise feeling well, without additional skin concerns.      Physical Exam:  Vitals: /68   Pulse 80   SpO2 99%   SKIN: Focused examination of face, buttock, anus was performed.  -1 very superficial anal fissure at the inferior anal opening.  No other papules pustules lichenification or rash today  -2 inflammatory papules on the chin and jawline and several scattered 1 mm white firm papules on the cheeks    - No other lesions of concern on areas examined.     Medications:  Current Outpatient Medications   Medication     desonide (DESOWEN) 0.05 % external ointment     Menthol-Zinc Oxide (CALMOSEPTINE EX)     tretinoin (RETIN-A) 0.025 % external cream     No current facility-administered medications for this visit.      Past Medical History:   There is no problem list on file for this patient.    No past medical history on file.    CC America Rowe, APRN CNP  [address]  [place] [address] on close of this encounter.

## 2022-05-26 NOTE — PROGRESS NOTES
Patient reports using Advantan cream to anus 2 x a day for 2 weeks then tapering down and discontinuing after 2 weeks. (for pruritus ani)    Kizzy DICKSON

## 2022-05-26 NOTE — LETTER
5/26/2022         RE: Prachi Mosley  [address]   [place] [address]        Dear Colleague,    Thank you for referring your patient, Prachi Mosley, to the [facility]. Please see a copy of my visit note below.    [facility] Dermatology Note  Encounter Date: May 26, 2022  Office Visit     Reviewed patients past medical history and pertinent chart review prior to patients visit today.     Dermatology Problem List:  Family history of psoriasis.       ____________________________________________    Assessment & Plan:     # 1. Pruritus ani  Advised to wear primarily cotton underwear and no thanks.  Continue not to use any wipes to the perianal area.  Continue zinc paste after bowel movement.  Start desonide ointment twice daily for 2 to 3 weeks then decrease to once daily for 2 weeks then decrease to every other day for 2 weeks then discontinue.  Advised to avoid face, groin and skin folds. Councled about use and side effects of thinning of the skin, striae, erythema, and worsening of rash.   Advised to combine equal parts zinc oxide, Vaseline, and desonide ointment and use twice daily as needed for itching for flares.  If this is not effective I think we should try adding in some ketoconazole as well.  We discussed the itch scratch cycle.    - Adult Dermatology Referral  - desonide (DESOWEN) 0.05 % external ointment; Apply topically 2 times daily For 3 weeks then once daily for 2 weeks then every other day for 2 weeks.  Dispense: 60 g; Refill: 0    2.  Mild acne vulgaris and milia cyst  - tretinoin (RETIN-A) 0.025 % external cream; Use every night to all areas of acne  Dispense: 45 g; Refill: 11      WOODROW Silva CNP on 5/26/2022 at 2:12 PM   _______________________________________    CC: Derm Problem (Idalmis annal itching)    HPI:  Ms. Prahci Mosley is a(n) 37 year old female who presents today as a new patient for anal itching. She says she has always  had occasional itchy irritation of the anus for the past 15 years. She thinks he may have some irritable bowel because she regularly has loose stools. About 9 months ago she started having some blood in her stool and she saw colorectal surgery and did a colonoscopy and did not see anything cancerous. She was tested for HPV which came back negative. Calmoseptin cream (zinc + menthol) she is using 2-3 times or more per day after each stool. This is not helping.     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     She tried over the counter hydrocortisone and this was not helpful. She then switched to prescription advantan and this stopped the itching. She used BID for 1-2 weeks then tapered down off of it and went back to the hydrocortisone and was able to stop it but it came back about 1 week later.     Negative for hpv. In c    She has family history of psoriasis. She has some flaky skin on her scalp that she wonders if it could be psorasis.   Patient is otherwise feeling well, without additional skin concerns.      Physical Exam:  Vitals: /68   Pulse 80   SpO2 99%   SKIN: Focused examination of face, buttock, anus was performed.  -1 very superficial anal fissure at the inferior anal opening.  No other papules pustules lichenification or rash today  -2 inflammatory papules on the chin and jawline and several scattered 1 mm white firm papules on the cheeks    - No other lesions of concern on areas examined.     Medications:  Current Outpatient Medications   Medication     desonide (DESOWEN) 0.05 % external ointment     Menthol-Zinc Oxide (CALMOSEPTINE EX)     tretinoin (RETIN-A) 0.025 % external cream     No current facility-administered medications for this visit.      Past Medical History:   There is no problem list on file for this patient.    No past medical history on file.    CC America Rowe, APRN CNP  [address] [facility] [address]  [place]  [address] on close of this encounter.     Patient reports using Advantan cream to anus 2 x a day for 2 weeks then tapering down and discontinuing after 2 weeks. (for pruritus ani)    Kizzy DICKSON        Again, thank you for allowing me to participate in the care of your patient.        Sincerely,        WOODROW Silva CNP

## 2022-09-18 ENCOUNTER — HEALTH MAINTENANCE LETTER (OUTPATIENT)
Age: 37
End: 2022-09-18

## 2023-06-04 ENCOUNTER — HEALTH MAINTENANCE LETTER (OUTPATIENT)
Age: 38
End: 2023-06-04

## 2024-07-14 ENCOUNTER — HEALTH MAINTENANCE LETTER (OUTPATIENT)
Age: 39
End: 2024-07-14

## 2024-08-30 ENCOUNTER — OFFICE VISIT (OUTPATIENT)
Dept: DERMATOLOGY | Facility: CLINIC | Age: 39
End: 2024-08-30
Payer: COMMERCIAL

## 2024-08-30 DIAGNOSIS — L82.1 SEBORRHEIC KERATOSES: ICD-10-CM

## 2024-08-30 DIAGNOSIS — D18.01 CHERRY ANGIOMA: ICD-10-CM

## 2024-08-30 DIAGNOSIS — L72.0 MILIA: Primary | ICD-10-CM

## 2024-08-30 DIAGNOSIS — L81.4 LENTIGINES: ICD-10-CM

## 2024-08-30 DIAGNOSIS — D22.9 MULTIPLE NEVI: ICD-10-CM

## 2024-08-30 DIAGNOSIS — L65.9 LOSS OF HAIR: ICD-10-CM

## 2024-08-30 DIAGNOSIS — Z12.83 ENCOUNTER FOR SCREENING FOR MALIGNANT NEOPLASM OF SKIN: ICD-10-CM

## 2024-08-30 PROCEDURE — 99214 OFFICE O/P EST MOD 30 MIN: CPT | Performed by: PHYSICIAN ASSISTANT

## 2024-08-30 RX ORDER — TRETINOIN 1 MG/G
CREAM TOPICAL AT BEDTIME
Qty: 45 G | Refills: 3 | Status: SHIPPED | OUTPATIENT
Start: 2024-08-30

## 2024-08-30 ASSESSMENT — PAIN SCALES - GENERAL: PAINLEVEL: NO PAIN (0)

## 2024-08-30 NOTE — NURSING NOTE
Dermatology Rooming Note    Prachi Mosley's goals for this visit include:   Chief Complaint   Patient presents with    Derm Problem     Has a few nevus spots she would like checked out.      Master Gr, EMT  Clinic Support  [facility]     [phone]    Employed by [employer]

## 2024-08-30 NOTE — LETTER
"8/30/2024       RE: Prachi Mosley  [address] [facility] [address]     Dear Colleague,    Thank you for referring your patient, Prachi Mosley, to the [facility] D[facility]MAT[facility] [place] [facility]. Please see a copy of my visit note below.    [facility] Dermatology Note  Encounter Date: Aug 30, 2024  Office Visit     Reviewed patients past medical history and pertinent chart review prior to patients visit today.     Dermatology Problem List:  # Family history of psoriasis   # Pruritus ani  - desonide ointment  # Milia  - tretinoin 0.1% cream  # Hair loss  - minoxidil 5% solution     No personal history of skin cancer.  No family history of skin cancer.    Social history: Radiologist   ____________________________________________    Assessment & Plan:     # Milia  - Tretinoin [Retin A] 0.1% cream was prescribed today. A \"pea\" sized amount should be applied to the entire face nightly. Initially, the cream should be used every 3rd night for two weeks, then every other night for two weeks, then nightly as tolerated to limit irritation. Moisturization after application of the tretinoin cream will help with discomfort should irritation develop.    # Hair loss, suspect telogen effluvium unmasking androgenetic alopecia  - Start minoxidil 5% solution to the scalp daily  - We discussed potential side effects including scalp irritation and flaking. We discussed potential temporary shedding 2-6 weeks into treatment.   - Hair Loss Clinic consult placed.     # Multiple nevi, trunk and extremities  # Solar lentigines  - No concerning features on dermoscopy. We discussed the importance of self exams at home. ABCDE criteria and importance of photoprotection reviewed.     # Cherry angiomas  # Seborrheic keratoses  - We discussed the benign nature of the skin lesions. No treatment required. Continued observation recommended. Follow up with any " concerns.      Follow-up:  Annual for follow up full body skin exam, as needed for new or changing lesions or new concerns    All risks, benefits and alternatives were discussed with patient.  Patient is in agreement and understands the assessment and plan.  All questions were answered.  Amanda Hines PA-C  [facility]    ____________________________________________    CC: Derm Problem (Has a few nevus spots she would like checked out. 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 The patient requests evaluation of moles on the chin and right axilla. Second, she notes milia on the face. These did not resolved with intermittent use of tretinoin 0.025% cream. Finally, she has noticed hair thinning in recent years. Her scalp is asymptomatic. No areas of compete hair loss. No other specific cutaneous concerns today.     Physical Exam:  Vitals: There were no vitals taken for this visit.  SKIN: Total skin excluding the genitalia areas was performed. The exam included the scalp, face, neck, bilateral arms, chest, back, abdomen, bilateral legs, digits, mons pubis, buttocks, and nails.   - Macdonald III.  - Few <1 mm white papules on the cheeks, consistent with milia.   - Scalp without erythema, scale, scarring, or areas of complete hair loss.   - Multiple tan/brown macules and papules scattered throughout exam, consistent with benign nevi. No concerning features on dermoscopy.   - Scattered tan, homogenous macules scattered on sun exposed skin, consistent with solar lentigines.   - Scattered waxy, stuck on appearing papules and patches, consistent with seborrheic keratoses.    - Several 1-2 mm red dome shaped symmetric papules, consistent with cherry angiomas.     Medications:  Current Outpatient Medications   Medication Sig Dispense Refill     Menthol-Zinc Oxide (CALMOSEPTINE EX) Externally apply topically as  needed       desonide (DESOWEN) 0.05 % external ointment Apply topically 2 times daily For 3 weeks then once daily for 2 weeks then every other day for 2 weeks. (Patient not taking: Reported on 8/30/2024) 60 g 0     tretinoin (RETIN-A) 0.025 % external cream Use every night to all areas of acne (Patient not taking: Reported on 8/30/2024) 45 g 11     No current facility-administered medications for this visit.      Past Medical History:   There is no problem list on file for this patient.    History reviewed. No pertinent past medical history.    CC Referred Self, MD  No address on file on close of this encounter.      Again, thank you for allowing me to participate in the care of your patient.      Sincerely,    Amanda Hines PA-C

## 2024-08-30 NOTE — PROGRESS NOTES
"[facility] Dermatology Note  Encounter Date: Aug 30, 2024  Office Visit     Reviewed patients past medical history and pertinent chart review prior to patients visit today.     Dermatology Problem List:  # Family history of psoriasis   # Pruritus ani  - desonide ointment  # Milia  - tretinoin 0.1% cream  # Hair loss  - minoxidil 5% solution     No personal history of skin cancer.  No family history of skin cancer.    Social history: Radiologist   ____________________________________________    Assessment & Plan:     # Milia  - Tretinoin [Retin A] 0.1% cream was prescribed today. A \"pea\" sized amount should be applied to the entire face nightly. Initially, the cream should be used every 3rd night for two weeks, then every other night for two weeks, then nightly as tolerated to limit irritation. Moisturization after application of the tretinoin cream will help with discomfort should irritation develop.    # Hair loss, suspect telogen effluvium unmasking androgenetic alopecia  - Start minoxidil 5% solution to the scalp daily  - We discussed potential side effects including scalp irritation and flaking. We discussed potential temporary shedding 2-6 weeks into treatment.   - Hair Loss Clinic consult placed.     # Multiple nevi, trunk and extremities  # Solar lentigines  - No concerning features on dermoscopy. We discussed the importance of self exams at home. ABCDE criteria and importance of photoprotection reviewed.     # Cherry angiomas  # Seborrheic keratoses  - We discussed the benign nature of the skin lesions. No treatment required. Continued observation recommended. Follow up with any concerns.      Follow-up:  Annual for follow up full body skin exam, as needed for new or changing lesions or new concerns    All risks, benefits and alternatives were discussed with patient.  Patient is in agreement and understands the assessment and plan.  All questions were answered.  ELMO Espinoza " [facility]ermat[facility]    ____________________________________________    CC: Derm Problem (Has a few nevus spots she would like checked out. 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 The patient requests evaluation of moles on the chin and right axilla. Second, she notes milia on the face. These did not resolved with intermittent use of tretinoin 0.025% cream. Finally, she has noticed hair thinning in recent years. Her scalp is asymptomatic. No areas of compete hair loss. No other specific cutaneous concerns today.     Physical Exam:  Vitals: There were no vitals taken for this visit.  SKIN: Total skin excluding the genitalia areas was performed. The exam included the scalp, face, neck, bilateral arms, chest, back, abdomen, bilateral legs, digits, mons pubis, buttocks, and nails.   - Macdonald III.  - Few <1 mm white papules on the cheeks, consistent with milia.   - Scalp without erythema, scale, scarring, or areas of complete hair loss.   - Multiple tan/brown macules and papules scattered throughout exam, consistent with benign nevi. No concerning features on dermoscopy.   - Scattered tan, homogenous macules scattered on sun exposed skin, consistent with solar lentigines.   - Scattered waxy, stuck on appearing papules and patches, consistent with seborrheic keratoses.    - Several 1-2 mm red dome shaped symmetric papules, consistent with cherry angiomas.     Medications:  Current Outpatient Medications   Medication Sig Dispense Refill    Menthol-Zinc Oxide (CALMOSEPTINE EX) Externally apply topically as needed      desonide (DESOWEN) 0.05 % external ointment Apply topically 2 times daily For 3 weeks then once daily for 2 weeks then every other day for 2 weeks. (Patient not taking: Reported on 8/30/2024) 60 g 0    tretinoin (RETIN-A) 0.025 % external cream Use every night to all areas of acne (Patient not taking:  Reported on 8/30/2024) 45 g 11     No current facility-administered medications for this visit.      Past Medical History:   There is no problem list on file for this patient.    History reviewed. No pertinent past medical history.    CC Referred Self, MD  No address on file on close of this encounter.

## 2025-04-29 ENCOUNTER — ANCILLARY PROCEDURE (OUTPATIENT)
Dept: MAMMOGRAPHY | Facility: CLINIC | Age: 40
End: 2025-04-29
Payer: COMMERCIAL

## 2025-04-29 DIAGNOSIS — Z12.31 VISIT FOR SCREENING MAMMOGRAM: ICD-10-CM

## 2025-04-29 PROCEDURE — 77063 BREAST TOMOSYNTHESIS BI: CPT

## 2025-04-29 PROCEDURE — 77067 SCR MAMMO BI INCL CAD: CPT

## 2025-08-30 ENCOUNTER — HEALTH MAINTENANCE LETTER (OUTPATIENT)
Age: 40
End: 2025-08-30

## (undated) DEVICE — SUCTION MANIFOLD NEPTUNE 2 SYS 1 PORT 702-025-000

## (undated) DEVICE — GOWN IMPERVIOUS 2XL BLUE

## (undated) DEVICE — ENDO FORCEP BX CAPTURA PRO SPIKE G50696

## (undated) DEVICE — SOL WATER IRRIG 500ML BOTTLE 2F7113

## (undated) DEVICE — SPECIMEN CONTAINER 3OZ W/FORMALIN 59901

## (undated) DEVICE — TUBING SUCTION 12"X1/4" N612

## (undated) DEVICE — KIT ENDO TURNOVER/PROCEDURE CARRY-ON 101822